# Patient Record
(demographics unavailable — no encounter records)

---

## 2025-05-09 NOTE — HISTORY OF PRESENT ILLNESS
[FreeTextEntry1] : 23-year-old male presents for follow-up He was working about a week ago when he mistakenly sliced his left ring and middle fingers.  Seen in the ED sutures were placed and advised to follow-up with them for evaluation. Pain is throbbing, 5 out of 10, and is on and off.  Often during the day and at night.  Has tried pain medications Tylenol and ibuprofen with some relief.  Here for further evaluation.  He smokes marijuana occasionally and drinks alcohol socially. Denies numbness and tingling.

## 2025-05-09 NOTE — PHYSICAL EXAM
[Normal RUE] : Right Upper Extremity:  Inspection and /or palpation: no malalignment, asymmetry, crepitation, tenderness, masses or effusions.  Range of Motion Assessment: Full ROM, no pain, crepitation or contracture.  Stability Assessment: Stable, no subluxation or dislocation.  Muscle Strength/Tone Assessment: 5/5 Motor Strength, no atrophy (weakness), normal movements. [Tinel's Sign Median Nerve At The Wrist (Carpal Tunnel)] : negative Tinel's [Wrist Neurological Dysfunction Phalen's Maneuver Bilateral] : negative Phalen's [Reverse Phalen's Test Both Wrists] : negative Reverse Phalen's [Ender's Test For Radial Artery Patency Bilaterally] : Radial a. patency via Ender's test [Ender's Test For Ulnar Artery Patency Bilaterally] : Ulnar a. patency via Ender's test [Normal] : No respiratory distress and non-labored breathing on room air [de-identified] : Left upper extremity: Healing partial lacerations in the nail folds of ring and small finger.  Mild swelling but no erythema.  Able to make a full composite fist, with intact range of motion across all the IP joints.  Cap refill is brisk and intact sensation distally across all digits

## 2025-05-09 NOTE — ASSESSMENT
[FreeTextEntry1] : 23-year-old male with partial lacerations to left small and ring fingers We discussed with patient clinical findings today in detail Reassured that he has no neurovascular injury, or functional deficits Therefore recommend to continue local care with bacitracin and Band-Aid after daily's wash with soap and water, and that wounds will continue to heal over time.  And the nail will also regenerate over time, and may not have any nailbed deformity. All of his questions answered He understands and agrees with the plan